# Patient Record
Sex: FEMALE | Race: WHITE | NOT HISPANIC OR LATINO | Employment: STUDENT | URBAN - METROPOLITAN AREA
[De-identification: names, ages, dates, MRNs, and addresses within clinical notes are randomized per-mention and may not be internally consistent; named-entity substitution may affect disease eponyms.]

---
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
## 2021-01-04 RX ORDER — FLUOXETINE 10 MG/1
CAPSULE ORAL
Qty: 90 CAPSULE | Refills: 2 | OUTPATIENT
Start: 2021-01-04

## 2021-01-04 RX ORDER — FLUOXETINE HYDROCHLORIDE 40 MG/1
40 CAPSULE ORAL DAILY
COMMUNITY

## 2021-05-24 VITALS
BODY MASS INDEX: 28.66 KG/M2 | TEMPERATURE: 98.2 F | BODY MASS INDEX: 28.99 KG/M2 | SYSTOLIC BLOOD PRESSURE: 114 MMHG | DIASTOLIC BLOOD PRESSURE: 78 MMHG | WEIGHT: 174 LBS | SYSTOLIC BLOOD PRESSURE: 110 MMHG | HEIGHT: 65 IN | WEIGHT: 172.62 LBS | HEART RATE: 82 BPM | RESPIRATION RATE: 14 BRPM | OXYGEN SATURATION: 93 % | SYSTOLIC BLOOD PRESSURE: 118 MMHG | DIASTOLIC BLOOD PRESSURE: 82 MMHG | DIASTOLIC BLOOD PRESSURE: 70 MMHG | RESPIRATION RATE: 16 BRPM | HEART RATE: 80 BPM | HEART RATE: 88 BPM | WEIGHT: 172 LBS | HEIGHT: 65 IN

## 2022-02-12 VITALS
SYSTOLIC BLOOD PRESSURE: 107 MMHG | BODY MASS INDEX: 34.25 KG/M2 | TEMPERATURE: 98.4 F | OXYGEN SATURATION: 98 % | WEIGHT: 209 LBS | DIASTOLIC BLOOD PRESSURE: 71 MMHG | HEART RATE: 91 BPM

## 2022-02-12 VITALS
SYSTOLIC BLOOD PRESSURE: 102 MMHG | HEIGHT: 66 IN | DIASTOLIC BLOOD PRESSURE: 66 MMHG | RESPIRATION RATE: 20 BRPM | BODY MASS INDEX: 31.82 KG/M2 | WEIGHT: 198 LBS | TEMPERATURE: 99.4 F | HEART RATE: 100 BPM

## 2022-02-15 NOTE — PROGRESS NOTES
Patient:   HIRAL DUBON            MRN: [id number]            FIN: [id number]               Age:   20 years     Sex:  Female     :  2000   Associated Diagnoses:   Acute otitis media, right   Author:   Ned Centeno MD      Visit Information      Date of Service: 03/10/2020 06:20 pm  Performing Location: [facility]  Encounter#: [id number]      Primary Care Provider (PCP):  NONE ,       Referring Provider:  Ned Centeno MD    NPI# [id number]      Chief Complaint   3/10/2020 6:26 PM CDT    Patient presents with sore throat, bilateral ear pain and nasal congestion        History of Present Illness   chief complaint and symptoms as noted above confirmed with patient   sxs 1 week  green nasal dc  slight cough  no sob  no fever      Review of Systems   Constitutional:  Negative except as documented in history of present illness.    Eye:  Negative.    Ear/Nose/Mouth/Throat:  Negative except as documented in history of present illness.    Respiratory:  Negative except as documented in history of present illness.    Cardiovascular:  Negative.    Integumentary:  Negative.    Neurologic:  Negative.       Health Status   Allergies:    Allergic Reactions (Selected)  No Known Medication Allergies   Medications:  (Selected)   Prescriptions  Prescribed  Zofran ODT 8 mg oral tablet, disintegrating: = 1 tab(s) ( 8 mg ), po, q8 hrs, PRN: for nausea/vomiting, # 12 tab(s), 0 Refill(s), Type: Maintenance, Pharmacy: [facility] #[id number], 1 tab(s) Oral q8 hrs,PRN:for nausea/vomiting  amoxicillin 875 mg oral tablet: = 1 tab(s) ( 875 mg ), Oral, bid, x 10 day(s), # 20 tab(s), 0 Refill(s), Type: Acute, Pharmacy: [facility] #[id number], 1 tab(s) Oral bid,x10 day(s)  Documented Medications  Documented  FLUoxetine: ( 30 mg ), Oral, Instructions: 30 mg daily, 0 Refill(s), Type: Maintenance  Nexplanon 68 mg subcutaneous implant: = 1 EA ( 68 mg ), Subcutaneous, once, 0 Refill(s), Type:  Maintenance  fluticasone 50 mcg/inh nasal spray: Nasal, daily, 0 Refill(s), Type: Maintenance,    Medications          *denotes recorded medication          *FLUoxetine: 30 mg, Oral, 30 mg daily, 0 Refill(s).          amoxicillin 875 mg oral tablet: 875 mg, 1 tab(s), Oral, bid, for 10 day(s), 20 tab(s), 0 Refill(s).          *Nexplanon 68 mg subcutaneous implant: 68 mg, 1 EA, Subcutaneous, once, 0 Refill(s).          *fluticasone 50 mcg/inh nasal spray: Nasal, daily, 0 Refill(s).          Zofran ODT 8 mg oral tablet, disintegratin mg, 1 tab(s), po, q8 hrs, PRN: for nausea/vomiting, 12 tab(s), 0 Refill(s).       Problem list:    All Problems  Obesity / SNOMED CT 6874480787 / Probable      Histories   Past Medical History:    No active or resolved past medical history items have been selected or recorded.   Family History:    CA - Breast cancer  Grandmother (M)  Thyroid disease  Father  Diabetes mellitus type II  Grandfather (P)  Kidney disease  Father  Depression  Sister  CA - Cancer of prostate  Grandfather (P)     Procedure history:    Ts and As - Tonsillectomy and adenoidectomy (SNOMED CT 7885932393).  History of appendectomy (SNOMED CT 4736721884).  History of cholecystectomy (SNOMED CT 2560325433).  Extraction of wisdom tooth (SNOMED CT 592824765).   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      Home and Environment Assessment            Marital status: Single.      Nutrition and Health Assessment            Type of diet: Regular.  Sleeping concerns: Yes.  Concerns about weight/body image: No.  Obtaining food is a               problem: No.      Exercise and Physical Activity Assessment            Exercise type: Walking.        Physical Examination   Vital Signs    3/10/2020 6:26 PM CDT Temperature Tympanic 98.4 DegF    Peripheral Pulse Rate 91 bpm    Systolic Blood Pressure 107 mmHg    Diastolic Blood Pressure 71 mmHg    Mean Arterial Pressure 83 mmHg    BP Site Right arm    BP Method Electronic    Oxygen Saturation 98 %      Measurements from flowsheet : Measurements   3/10/2020 6:26 PM CDT Height/Length Estimated 65.5 in    Weight Measured - Standard 209 lb      General:  Alert and oriented, No acute distress.    Eye:  Normal conjunctiva.    HENT:  No pharyngeal erythema.         Ear: Right ear, Tympanic membrane ( Erythematous, Fluid in middle ear ).    Neck:  Supple, Non-tender, No lymphadenopathy.    Respiratory:  Lungs are clear to auscultation, Respirations are non-labored.    Cardiovascular:  Normal rate, Regular rhythm.    Neurologic:  Alert, Oriented.       Impression and Plan   Diagnosis     Acute otitis media, right (LPM40-YL H66.91).     Course:  Not progressing as expected.    Plan:  amox  fu 1 week if not better sooner if worse.    Patient Instructions:       Counseled: Patient, Regarding diagnosis, Regarding treatment, Regarding medications, Activity.

## 2022-02-15 NOTE — NURSING NOTE
Depression Screening Entered On:  1/29/2020 6:54 PM CST    Performed On:  1/29/2020 6:54 PM CST by Cain Hickey CMA               Depression Screening   Depression Screening Assessment PHQ-9 :   Declines to be asked   Cain Hickey CMA - 1/29/2020 6:54 PM CST

## 2022-02-15 NOTE — NURSING NOTE
Pt given 8mg ODT ondansetron at  1800 for nausea per G orders.  Cain Hickey [facility]  Lot#[id number]  exp-7/30/2021  [facility].

## 2022-02-15 NOTE — NURSING NOTE
"Comprehensive Intake Entered On:  1/29/2020 5:37 PM CST    Performed On:  1/29/2020 5:23 PM CST by Cain Hickey CMA               Summary   Chief Complaint :   New pt here for vomiting,nausea and upper back pain that started this morning.  Pt states she has \"vomited 11 times today\"   Last Menstrual Period :   1/26/2020 CST   Menstrual Status :   Menarcheal   Weight Measured :   198 lb(Converted to: 198 lb 0 oz, 89.81 kg)    Height Measured :   65.5 in(Converted to: 5 ft 5 in, 166.37 cm)    Body Mass Index :   32.44 kg/m2   Body Surface Area :   2.04 m2   Systolic Blood Pressure :   102 mmHg   Diastolic Blood Pressure :   66 mmHg   Mean Arterial Pressure :   78 mmHg   Peripheral Pulse Rate :   100 bpm   BP Site :   Right arm   Pulse Site :   Radial artery   Temperature Tympanic :   99.4 DegF(Converted to: 37.4 DegC)    Respiratory Rate :   20 br/min   Cain Hickey CMA - 1/29/2020 5:23 PM CST   Health Status   Allergies Verified? :   Yes   Medication History Verified? :   Yes   Medical History Verified? :   Yes   Pre-Visit Planning Status :   Not completed   Tobacco Use? :   Former smoker   Cain Hickey CMA - 1/29/2020 5:23 PM CST   Meds / Allergies   (As Of: 1/29/2020 5:37:45 PM CST)   Allergies (Active)   No Known Medication Allergies  Estimated Onset Date:   Unspecified ; Created By:   Cain Hickey CMA; Reaction Status:   Active ; Category:   Drug ; Substance:   No Known Medication Allergies ; Type:   Allergy ; Updated By:   Cain Hickey CMA; Reviewed Date:   1/29/2020 5:32 PM CST        Medication List   (As Of: 1/29/2020 5:37:45 PM CST)   Home Meds    FLUoxetine  :   FLUoxetine ; Status:   Documented ; Ordered As Mnemonic:   FLUoxetine ; Simple Display Line:   30 mg, Oral, 30 mg daily, 0 Refill(s) ; Catalog Code:   FLUoxetine ; Order Dt/Tm:   1/29/2020 5:32:03 PM CST          etonogestrel  :   etonogestrel ; Status:   Documented ; Ordered As Mnemonic:   Nexplanon 68 mg subcutaneous implant ; Simple " Display Line:   68 mg, 1 EA, Subcutaneous, once, 0 Refill(s) ; Catalog Code:   etonogestrel ; Order Dt/Tm:   1/29/2020 5:31:20 PM CST          fluticasone nasal  :   fluticasone nasal ; Status:   Documented ; Ordered As Mnemonic:   fluticasone 50 mcg/inh nasal spray ; Simple Display Line:   Nasal, daily, 0 Refill(s) ; Catalog Code:   fluticasone nasal ; Order Dt/Tm:   1/29/2020 5:31:47 PM CST            Past Medical History   Past Medical History   (As Of: 1/29/2020 5:37:45 PM CST)     Procedures / Surgeries        -    Procedure History   (As Of: 1/29/2020 5:37:45 PM CST)     Anesthesia Minutes:   0 ; Procedure Name:   Extraction of wisdom tooth ; Procedure Minutes:   0 ; Last Reviewed Dt/Tm:   1/29/2020 5:34:04 PM CST            Anesthesia Minutes:   0 ; Procedure Name:   History of cholecystectomy ; Procedure Minutes:   0 ; Last Reviewed Dt/Tm:   1/29/2020 5:33:57 PM CST            Anesthesia Minutes:   0 ; Procedure Name:   History of appendectomy ; Procedure Minutes:   0 ; Last Reviewed Dt/Tm:   1/29/2020 5:33:47 PM CST            Anesthesia Minutes:   0 ; Procedure Name:   Ts and As - Tonsillectomy and adenoidectomy ; Procedure Minutes:   0 ; Last Reviewed Dt/Tm:   1/29/2020 5:33:31 PM CST            Family History   Family History   (As Of: 1/29/2020 5:37:45 PM CST)     Social History   Social History   (As Of: 1/29/2020 5:37:45 PM CST)   Tobacco:        Never (less than 100 in lifetime)   (Last Updated: 1/29/2020 5:32:44 PM CST by Cain Hickey CMA)          Employment/School:        Student, 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.   (Last Updated: 1/29/2020 5:33:04 PM CST by Cain Hickey CMA)

## 2022-02-15 NOTE — PROGRESS NOTES
"   Patient:   HIRAL DUBON            MRN: [id number]            FIN: [id number]               Age:   19 years     Sex:  Female     :  2000   Associated Diagnoses:   Gastroenteritis, acute   Author:   Rosendo Granados PA-C      Chief Complaint   2020 5:23 PM CST    New pt here for vomiting,nausea and upper back pain that started this morning.  Pt states she has \"vomited 11 times today\"      History of Present Illness   Chief complaint and symptoms noted above and confirmed with patient   nausea and vomiting that started this morning,  emesis x 11 today  chills and sweats, upper back pain  has not been able to keep fluids down today  no new foods,   took some pepto bismol this am,   she is having some blood in her urine because she is currently menstruating (despite being on nexplanon)         Review of Systems   Constitutional:  Chills, Sweats.    Ear/Nose/Mouth/Throat:  No nasal congestion, No sore throat.    Respiratory:  No cough.    Gastrointestinal:  Nausea, Vomiting, No diarrhea, No constipation.    Genitourinary:  No dysuria.       Health Status   Allergies:    Allergic Reactions (Selected)  No Known Medication Allergies   Medications:  (Selected)   Documented Medications  Documented  FLUoxetine: ( 30 mg ), Oral, Instructions: 30 mg daily, 0 Refill(s), Type: Maintenance  Nexplanon 68 mg subcutaneous implant: = 1 EA ( 68 mg ), Subcutaneous, once, 0 Refill(s), Type: Maintenance  fluticasone 50 mcg/inh nasal spray: Nasal, daily, 0 Refill(s), Type: Maintenance   Problem list:    All Problems  Obesity / SNOMED CT 9064650337 / Probable      Histories   Past Medical History:    No active or resolved past medical history items have been selected or recorded.   Family History:    CA - Breast cancer  Grandmother (M)  Thyroid disease  Father  Diabetes mellitus type II  Grandfather (P)  Kidney disease  Father  Depression  Sister  CA - Cancer of prostate  Grandfather (P)     Procedure history:    Ts and As - " Tonsillectomy and adenoidectomy (5413738200).  History of appendectomy (9697094497).  History of cholecystectomy (3088552733).  Extraction of wisdom tooth (474989730).      Physical Examination   Vital Signs   1/29/2020 5:23 PM CST Temperature Tympanic 99.4 DegF    Peripheral Pulse Rate 100 bpm    Pulse Site Radial artery    Respiratory Rate 20 br/min    Systolic Blood Pressure 102 mmHg    Diastolic Blood Pressure 66 mmHg    Mean Arterial Pressure 78 mmHg    BP Site Right arm      Measurements from flowsheet : Measurements   1/29/2020 5:23 PM CST Height Measured - Standard 65.5 in    Weight Measured - Standard 198 lb    BSA 2.04 m2    Body Mass Index 32.44 kg/m2    Body Mass Index Percentile 96.17      General:  Mild distress.    HENT:  Tympanic membranes are clear, No pharyngeal erythema, No sinus tenderness.    Neck:  Supple, Non-tender, No lymphadenopathy.    Respiratory:  Lungs are clear to auscultation.    Cardiovascular:  Normal rate, Regular rhythm, No murmur.    Gastrointestinal:  Soft, Non-distended, Normal bowel sounds, No organomegaly, RLQ tenderness.    Genitourinary:  right CVA tenderness.    Psychiatric:  Appropriate mood & affect.       Review / Management   Results review:  Lab results   1/29/2020 6:38 PM CST UA Epithelial Cells Moderate    UA Mucous Present    UA WBC 0-2    UA RBC >100    UA Bacteria Many   1/29/2020 6:24 PM CST UA pH 5.5    UA Specific Gravity > OR = 1.030    UA Glucose NEGATIVE    UA Bilirubin NEGATIVE    UA Ketones 2+    Urine Occult Blood 3+    UA Protein NEGATIVE    UA Nitrite NEGATIVE    UA Leukocyte Esterase NEGATIVE   1/29/2020 6:17 PM CST Sodium Level 139 mmol/L    Potassium Level 4.1 mmol/L    Chloride Level 107 mmol/L    CO2 Level 24 mmol/L    AGAP 8    Glucose Level 120 mg/dL    BUN 16 mg/dL    Creatinine Level 0.74 mg/dL    BUN/Creat Ratio 22    eGFR  >60    eGFR Non-African American >60    Calcium Level 9.3 mg/dL    Bilirubin Total 1.0 mg/dL     Bilirubin Direct 0.3 mg/dL    Bilirubin Indirect 0.7 mg/dL    Alkaline Phosphatase 71 IU/L    AST/SGOT 19 IU/L    ALT/SGPT 14 IU/L    Protein Total 7.2 g/dL    Albumin Level 4.5 g/dL    Globulin 2.7 g/dL    A/G Ratio 1.7   .         Interpretation: Labs unremarkable.    Course:  she is given 8 mg zofran ODT and feels a little better 30 min later.       Impression and Plan   Diagnosis     Gastroenteritis, acute (YDC01-LF K52.9).     Plan:  Oral rehydration.         Diet: Bananas, Rice, Applesauce and Toast diet, Increase fluid intake.    Summary:  will give zofran to use prn at home, advised to push fluids (frequent small sips), bland diet, follow up if not improving.    Orders     Orders   Lab (Gen Lab  Reference Lab):  POC URINALYSIS, UA* ([facility]) (Order): Specimen Type: Urine, Collection Date: 1/29/2020 5:55 PM CST  Requests (Lab):  Hepatic Function Panel (Request) (Order): Priority: Urgent, Gastroenteritis, acute  Basic Metabolic Panel (Request) (Order): Priority: Urgent, Gastroenteritis, acute.     Orders   Pharmacy:  Zofran ODT 8 mg oral tablet, disintegrating (Prescribe): = 1 tab(s) ( 8 mg ), po, q8 hrs, PRN: for nausea/vomiting, # 12 tab(s), 0 Refill(s), Type: Maintenance, [id number], 1 tab(s) Oral q8 hrs,PRN:for nausea/vomiting.     Orders   Charges (Evaluation and Management):  [id number] office outpatient new 30 minutes (Charge) (Order): Quantity: 1, Gastroenteritis, acute.

## 2022-02-15 NOTE — NURSING NOTE
Comprehensive Intake Entered On:  3/10/2020 6:31 PM CDT    Performed On:  3/10/2020 6:26 PM CDT by Mary Grace Hummel CMA               Summary   Chief Complaint :   Patient presents with sore throat, bilateral ear pain and nasal congestion   Menstrual Status :   Menarcheal   Weight Measured :   209 lb(Converted to: 209 lb 0 oz, 94.80 kg)    Height/Length Estimated :   65.5 in(Converted to: 5 ft 5 in, 166.37 cm)    Systolic Blood Pressure :   107 mmHg   Diastolic Blood Pressure :   71 mmHg   Mean Arterial Pressure :   83 mmHg   Peripheral Pulse Rate :   91 bpm   BP Site :   Right arm   BP Method :   Electronic   Temperature Tympanic :   98.4 DegF(Converted to: 36.9 DegC)    Oxygen Saturation :   98 %   Mary Grace Hummel CMA - 3/10/2020 6:26 PM CDT   Health Status   Allergies Verified? :   Yes   Medication History Verified? :   Yes   Medical History Verified? :   Yes   Pre-Visit Planning Status :   Completed   Tobacco Use? :   Never smoker   Mary Grace Hummel CMA - 3/10/2020 6:26 PM CDT   Consents   Consent for Immunization Exchange :   Consent Granted   Consent for Immunizations to Providers :   Consent Granted   Mary Grace Hummel CMA - 3/10/2020 6:26 PM CDT   Meds / Allergies   (As Of: 3/10/2020 6:31:44 PM CDT)   Allergies (Active)   No Known Medication Allergies  Estimated Onset Date:   Unspecified ; Created By:   Cain Hickey CMA; Reaction Status:   Active ; Category:   Drug ; Substance:   No Known Medication Allergies ; Type:   Allergy ; Updated By:   Cain Hickey CMA; Reviewed Date:   3/10/2020 6:29 PM CDT        Medication List   (As Of: 3/10/2020 6:31:44 PM CDT)   Prescription/Discharge Order    ondansetron  :   ondansetron ; Status:   Prescribed ; Ordered As Mnemonic:   Zofran ODT 8 mg oral tablet, disintegrating ; Simple Display Line:   8 mg, 1 tab(s), po, q8 hrs, PRN: for nausea/vomiting, 12 tab(s), 0 Refill(s) ; Ordering Provider:   Rosendo Granados PA-C; Catalog Code:   ondansetron ; Order Dt/Tm:   1/29/2020  7:08:11 PM CST            Home Meds    etonogestrel  :   etonogestrel ; Status:   Documented ; Ordered As Mnemonic:   Nexplanon 68 mg subcutaneous implant ; Simple Display Line:   68 mg, 1 EA, Subcutaneous, once, 0 Refill(s) ; Catalog Code:   etonogestrel ; Order Dt/Tm:   1/29/2020 5:31:20 PM CST          FLUoxetine  :   FLUoxetine ; Status:   Documented ; Ordered As Mnemonic:   FLUoxetine ; Simple Display Line:   30 mg, Oral, 30 mg daily, 0 Refill(s) ; Catalog Code:   FLUoxetine ; Order Dt/Tm:   1/29/2020 5:32:03 PM CST          fluticasone nasal  :   fluticasone nasal ; Status:   Documented ; Ordered As Mnemonic:   fluticasone 50 mcg/inh nasal spray ; Simple Display Line:   Nasal, daily, 0 Refill(s) ; Catalog Code:   fluticasone nasal ; Order Dt/Tm:   1/29/2020 5:31:47 PM CST

## 2022-06-29 ENCOUNTER — OFFICE VISIT (OUTPATIENT)
Dept: FAMILY MEDICINE | Facility: CLINIC | Age: 22
End: 2022-06-29
Payer: COMMERCIAL

## 2022-06-29 VITALS
RESPIRATION RATE: 20 BRPM | BODY MASS INDEX: 40.98 KG/M2 | OXYGEN SATURATION: 99 % | HEIGHT: 66 IN | DIASTOLIC BLOOD PRESSURE: 70 MMHG | WEIGHT: 255 LBS | TEMPERATURE: 99.1 F | HEART RATE: 100 BPM | SYSTOLIC BLOOD PRESSURE: 102 MMHG

## 2022-06-29 DIAGNOSIS — E66.01 MORBID OBESITY (H): ICD-10-CM

## 2022-06-29 DIAGNOSIS — Z23 NEED FOR VACCINATION: ICD-10-CM

## 2022-06-29 DIAGNOSIS — Z71.84 TRAVEL ADVICE ENCOUNTER: Primary | ICD-10-CM

## 2022-06-29 DIAGNOSIS — J06.9 VIRAL URI: ICD-10-CM

## 2022-06-29 PROBLEM — R00.0 TACHYCARDIA: Status: ACTIVE | Noted: 2021-12-22

## 2022-06-29 PROBLEM — Q79.62 EHLERS-DANLOS, HYPERMOBILE TYPE: Status: ACTIVE | Noted: 2019-01-31

## 2022-06-29 PROBLEM — F51.04 PSYCHOPHYSIOLOGICAL INSOMNIA: Status: ACTIVE | Noted: 2021-05-24

## 2022-06-29 PROBLEM — R55 VASOVAGAL NEAR SYNCOPE: Status: ACTIVE | Noted: 2018-01-02

## 2022-06-29 PROBLEM — E66.9 OBESITY: Status: ACTIVE | Noted: 2022-06-29

## 2022-06-29 PROBLEM — F41.1 GAD (GENERALIZED ANXIETY DISORDER): Status: ACTIVE | Noted: 2018-04-25

## 2022-06-29 PROBLEM — Z90.49 HISTORY OF LAPAROSCOPIC CHOLECYSTECTOMY: Status: ACTIVE | Noted: 2017-07-17

## 2022-06-29 PROBLEM — Z90.49 HISTORY OF APPENDECTOMY: Status: ACTIVE | Noted: 2017-07-17

## 2022-06-29 PROBLEM — G43.009 MIGRAINE WITHOUT AURA AND WITHOUT STATUS MIGRAINOSUS, NOT INTRACTABLE: Status: ACTIVE | Noted: 2020-12-02

## 2022-06-29 PROBLEM — N94.6 DYSMENORRHEA: Status: ACTIVE | Noted: 2019-07-03

## 2022-06-29 PROCEDURE — 90651 9VHPV VACCINE 2/3 DOSE IM: CPT | Performed by: PHYSICIAN ASSISTANT

## 2022-06-29 PROCEDURE — 90472 IMMUNIZATION ADMIN EACH ADD: CPT | Performed by: PHYSICIAN ASSISTANT

## 2022-06-29 PROCEDURE — U0003 INFECTIOUS AGENT DETECTION BY NUCLEIC ACID (DNA OR RNA); SEVERE ACUTE RESPIRATORY SYNDROME CORONAVIRUS 2 (SARS-COV-2) (CORONAVIRUS DISEASE [COVID-19]), AMPLIFIED PROBE TECHNIQUE, MAKING USE OF HIGH THROUGHPUT TECHNOLOGIES AS DESCRIBED BY CMS-2020-01-R: HCPCS | Performed by: PHYSICIAN ASSISTANT

## 2022-06-29 PROCEDURE — 90471 IMMUNIZATION ADMIN: CPT | Performed by: PHYSICIAN ASSISTANT

## 2022-06-29 PROCEDURE — U0005 INFEC AGEN DETEC AMPLI PROBE: HCPCS | Performed by: PHYSICIAN ASSISTANT

## 2022-06-29 PROCEDURE — 99214 OFFICE O/P EST MOD 30 MIN: CPT | Mod: CS | Performed by: PHYSICIAN ASSISTANT

## 2022-06-29 PROCEDURE — 90715 TDAP VACCINE 7 YRS/> IM: CPT | Performed by: PHYSICIAN ASSISTANT

## 2022-06-29 RX ORDER — NORTRIPTYLINE HCL 10 MG
CAPSULE ORAL
COMMUNITY
Start: 2022-06-18

## 2022-06-29 RX ORDER — NAPROXEN SODIUM 220 MG
220 TABLET ORAL
COMMUNITY

## 2022-06-29 RX ORDER — FROVATRIPTAN SUCCINATE 2.5 MG/1
TABLET, FILM COATED ORAL
COMMUNITY
Start: 2021-02-04

## 2022-06-29 RX ORDER — IBUPROFEN 200 MG
200-400 TABLET ORAL
COMMUNITY

## 2022-06-29 RX ORDER — ACETAMINOPHEN 500 MG
500 TABLET ORAL
COMMUNITY

## 2022-06-29 RX ORDER — AZITHROMYCIN 500 MG/1
500 TABLET, FILM COATED ORAL DAILY
Qty: 3 TABLET | Refills: 0 | Status: SHIPPED | OUTPATIENT
Start: 2022-06-29 | End: 2022-07-02

## 2022-06-29 RX ORDER — OMEGA-3/DHA/EPA/FISH OIL 60 MG-90MG
500 CAPSULE ORAL
COMMUNITY

## 2022-06-29 RX ORDER — FLUOXETINE 40 MG/1
40 CAPSULE ORAL
COMMUNITY
Start: 2021-12-22

## 2022-06-29 ASSESSMENT — ENCOUNTER SYMPTOMS: COUGH: 1

## 2022-06-29 NOTE — PATIENT INSTRUCTIONS
You need to take the typhoid oral medication as ordered.  You will not have full protection immediately.    You were given zithrormax pills to take for severe traveler's diarrhea.    Use imodium or pepto bismol for mild diarrhea.    Take precautions to drink only bottled water, peal fruits and vegetables, make sure all meats are well cooked.

## 2022-06-29 NOTE — PROGRESS NOTES
Assessment & Plan     Travel advice encounter    Tetanus immunization given   - azithromycin (ZITHROMAX) 500 MG tablet for severe traveler's diarrhea. Discussed management of mild diarrhea.   Dispense: 3 tablet; Refill: 0  - typhoid (VIVOTIF) CR capsule  Dispense: 4 capsule; Refill: 0  No need for malaria prophylaxis given location of travel.    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    Viral URI  Conservative measures discussed.  Push fluids, rest and ibuprofen or tylenol for comfort.        Need for vaccination      BILLY Pugh [facility]    Christiano Harmon is a 22 year old female who presents to clinic today for the following health issues:  Chief Complaint   Patient presents with     Cough     Dry cough x 2-3 weeks.     Cough    History of Present Illness       Reason for visit:  Dry, itchy cough  Symptom onset:  3-4 weeks ago  Symptoms include:  Dry, itchy cough  Symptom intensity:  Moderate  Symptom progression:  Worsening  Had these symptoms before:  No  What makes it worse:  Talking/ singing  What makes it better:  Cough drops    She eats 2-3 servings of fruits and vegetables daily.She consumes 0 sweetened beverage(s) daily.She exercises with enough effort to increase her heart rate 9 or less minutes per day.  She exercises with enough effort to increase her heart rate 3 or less days per week.   She is taking medications regularly.    Dry itchy cough.  Fits of coughing, talk or deep breathing.    No rhinorrhea, congestion no fevers.    no rhinorrhea, congestion, or facial pain or pressure.    Some mild allergies but not bothersome currently.    Allergy medication OTC helpful.    No nausea, vomiting diarrhea.    No sob or difficulty breathing.          Review of Systems   Respiratory: Positive for cough.      Constitutional, HEENT, cardiovascular, pulmonary, gi and  "gu systems are negative, except as otherwise noted.      Objective    /70 (BP Location: Right arm, Patient Position: Sitting, Cuff Size: Adult Large)   Pulse 100   Temp 99.1  F (37.3  C) (Tympanic)   Resp 20   Ht 1.664 m (5' 5.5\")   Wt 115.7 kg (255 lb)   SpO2 99%   BMI 41.79 kg/m    Physical Exam   GENERAL: healthy, alert and no distress  NECK: no adenopathy, no asymmetry, masses, or scars and thyroid normal to palpation  RESP: lungs clear to auscultation - no rales, rhonchi or wheezes  CV: regular rate and rhythm, normal S1 S2, no S3 or S4, no murmur, click or rub, no peripheral edema and peripheral pulses strong  ABDOMEN: soft, nontender, no hepatosplenomegaly, no masses and bowel sounds normal  MS: no gross musculoskeletal defects noted, no edema        "

## 2022-06-30 ENCOUNTER — TELEPHONE (OUTPATIENT)
Dept: FAMILY MEDICINE | Facility: CLINIC | Age: 22
End: 2022-06-30

## 2022-06-30 LAB — SARS-COV-2 RNA RESP QL NAA+PROBE: NEGATIVE

## 2022-06-30 NOTE — TELEPHONE ENCOUNTER
----- Message from Mercedes Rueda PA-C sent at 6/30/2022  5:10 PM CDT -----  Team - please call patient with results.

## 2022-07-24 ENCOUNTER — HEALTH MAINTENANCE LETTER (OUTPATIENT)
Age: 22
End: 2022-07-24

## 2022-08-08 ENCOUNTER — OFFICE VISIT (OUTPATIENT)
Dept: FAMILY MEDICINE | Facility: CLINIC | Age: 22
End: 2022-08-08
Payer: COMMERCIAL

## 2022-08-08 VITALS
BODY MASS INDEX: 40.98 KG/M2 | SYSTOLIC BLOOD PRESSURE: 138 MMHG | WEIGHT: 255 LBS | DIASTOLIC BLOOD PRESSURE: 84 MMHG | TEMPERATURE: 98.3 F | HEIGHT: 66 IN | HEART RATE: 120 BPM

## 2022-08-08 DIAGNOSIS — J02.9 SORE THROAT: ICD-10-CM

## 2022-08-08 DIAGNOSIS — R05.9 COUGH: ICD-10-CM

## 2022-08-08 DIAGNOSIS — R53.83 FATIGUE, UNSPECIFIED TYPE: ICD-10-CM

## 2022-08-08 DIAGNOSIS — J18.9 ATYPICAL PNEUMONIA: Primary | ICD-10-CM

## 2022-08-08 LAB
DEPRECATED S PYO AG THROAT QL EIA: NEGATIVE
GROUP A STREP BY PCR: NOT DETECTED

## 2022-08-08 PROCEDURE — 87651 STREP A DNA AMP PROBE: CPT | Performed by: FAMILY MEDICINE

## 2022-08-08 PROCEDURE — U0003 INFECTIOUS AGENT DETECTION BY NUCLEIC ACID (DNA OR RNA); SEVERE ACUTE RESPIRATORY SYNDROME CORONAVIRUS 2 (SARS-COV-2) (CORONAVIRUS DISEASE [COVID-19]), AMPLIFIED PROBE TECHNIQUE, MAKING USE OF HIGH THROUGHPUT TECHNOLOGIES AS DESCRIBED BY CMS-2020-01-R: HCPCS | Performed by: FAMILY MEDICINE

## 2022-08-08 PROCEDURE — U0005 INFEC AGEN DETEC AMPLI PROBE: HCPCS | Performed by: FAMILY MEDICINE

## 2022-08-08 PROCEDURE — 99213 OFFICE O/P EST LOW 20 MIN: CPT | Mod: CS | Performed by: FAMILY MEDICINE

## 2022-08-08 RX ORDER — ALBUTEROL SULFATE 90 UG/1
2 AEROSOL, METERED RESPIRATORY (INHALATION) EVERY 4 HOURS PRN
Qty: 18 G | Refills: 0 | Status: SHIPPED | OUTPATIENT
Start: 2022-08-08 | End: 2022-08-22

## 2022-08-08 ASSESSMENT — ENCOUNTER SYMPTOMS
COUGH: 1
SORE THROAT: 1

## 2022-08-08 ASSESSMENT — PATIENT HEALTH QUESTIONNAIRE - PHQ9
10. IF YOU CHECKED OFF ANY PROBLEMS, HOW DIFFICULT HAVE THESE PROBLEMS MADE IT FOR YOU TO DO YOUR WORK, TAKE CARE OF THINGS AT HOME, OR GET ALONG WITH OTHER PEOPLE: SOMEWHAT DIFFICULT
SUM OF ALL RESPONSES TO PHQ QUESTIONS 1-9: 14
SUM OF ALL RESPONSES TO PHQ QUESTIONS 1-9: 14

## 2022-08-08 NOTE — PROGRESS NOTES
"  Assessment & Plan   Problem List Items Addressed This Visit    None     Visit Diagnoses     Atypical pneumonia    -  Primary    Relevant Medications    albuterol (PROAIR HFA/PROVENTIL HFA/VENTOLIN HFA) 108 (90 Base) MCG/ACT inhaler    Sore throat        Relevant Orders    Streptococcus A Rapid Screen w/Reflex to PCR - Clinic Collect (Completed)    Symptomatic; Unknown COVID-19 Virus (Coronavirus) by PCR Nose    Group A Streptococcus PCR Throat Swab    Fatigue, unspecified type        Relevant Orders    Streptococcus A Rapid Screen w/Reflex to PCR - Clinic Collect (Completed)    Symptomatic; Unknown COVID-19 Virus (Coronavirus) by PCR Nose    Group A Streptococcus PCR Throat Swab    Cough        Relevant Medications    albuterol (PROAIR HFA/PROVENTIL HFA/VENTOLIN HFA) 108 (90 Base) MCG/ACT inhaler         She has azithromycin leftover at home from her travels because she did not need them.  She has 3 500 mg tablets.  I wrote out for her how she should take the medication.  We will add an albuterol inhaler 2 puffs every 4 hours as needed to see if this helps with her cough  If she does not get completely better with this regimen she will follow-up in clinic and I would recommend a chest x-ray at that point.  Patient verbalized understanding.           BMI:   Estimated body mass index is 41.79 kg/m  as calculated from the following:    Height as of this encounter: 1.664 m (5' 5.5\").    Weight as of this encounter: 115.7 kg (255 lb).           No follow-ups on file.    Melany Lucas MD  [facility]    Subjective      Patient is a 22-year-old female who complains of 3 weeks of cough which is productive.  She has a sore throat and her neck feels sore especially at night.  She has swollen lymph nodes in her neck.  She generally feels tired and at first she thought it was just leg but it has been ongoing.  She denies fevers and chills.  She has no shortness of breath.    She did a study " abroad for 2 weeks in [place].  She did get all required vaccines before she left.  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    She does report a dry itchy cough before she left for her study abroad and it resolved while she was gone.    Regarding patient's PHQ-9 score of 14 today: She states that she feels her depression is under good control on her fluoxetine and that her high score today is from her current illness with fatigue and low appetite.      Eden is a 22 year old, presenting for the following health issues:  Pharyngitis (3-4 weeks ), Cough (Neg at home covid test 1 week ago. ), Fatigue, and Otalgia (Right ear pain)      Pharyngitis   Associated symptoms include cough.   Cough  Associated symptoms include sore throat.   History of Present Illness       Reason for visit:  Sore throat and cough  Symptom onset:  3-4 weeks ago  Symptoms include:  Sore throat, cough, extreme fatigue  Symptom intensity:  Moderate  Symptom progression:  Worsening  Had these symptoms before:  No  What makes it worse:  The sore throat gets really bad at night  What makes it better:  Cough drops    She eats 2-3 servings of fruits and vegetables daily.She consumes 0 sweetened beverage(s) daily.She exercises with enough effort to increase her heart rate 9 or less minutes per day.  She exercises with enough effort to increase her heart rate 4 days per week.   She is taking medications regularly.    Today's PHQ-9         PHQ-9 Total Score: 14    PHQ-9 Q9 Thoughts of better off dead/self-harm past 2 weeks :   Not at all    How difficult have these problems made it for you to do your work, take care of things at home, or get along with other people: Somewhat difficult             Review of Systems   HENT: Positive for sore throat.    Respiratory: Positive for cough.    Negative except as listed in HPI        Objective    /84   Pulse 120   Temp 98.3  F (36.8  C)   Ht 1.664 m  "(5' 5.5\")   Wt 115.7 kg (255 lb)   BMI 41.79 kg/m    Body mass index is 41.79 kg/m .  Physical Exam   Vitals noted and within normal limits.  Patient is alert, oriented, and in no acute distress.  Eyes: Conjunctive not injected.  Ears: Canals patent, TMs intact, no erythema and no bulging.  Mouth: Mucous membranes pink and moist.  Pharynx is not erythematous.  Neck supple with no cervical lymphadenopathy.  Heart has a regular rate and rhythm with no murmurs.  Lungs are clear to auscultation bilaterally with good air entry.  No wheezes, rales, rhonchi.                        .  ..  "

## 2022-08-09 LAB — SARS-COV-2 RNA RESP QL NAA+PROBE: NEGATIVE

## 2022-08-21 DIAGNOSIS — J18.9 ATYPICAL PNEUMONIA: ICD-10-CM

## 2022-08-21 DIAGNOSIS — R05.9 COUGH: ICD-10-CM

## 2022-08-22 RX ORDER — ALBUTEROL SULFATE 90 UG/1
AEROSOL, METERED RESPIRATORY (INHALATION)
Qty: 6.7 G | Refills: 0 | Status: SHIPPED | OUTPATIENT
Start: 2022-08-22

## 2022-08-22 NOTE — TELEPHONE ENCOUNTER
"Routing refill request to provider for review/approval because:  Patient given medication on 8/8/2022 and is already requesting a refill.    Please advise.    Last Written Prescription Date:  8/8/2022  Last Fill Quantity: 18 g,  # refills: 0   Last office visit provider:  8/8/2022 - Atypical pneumonia     Requested Prescriptions   Pending Prescriptions Disp Refills     albuterol (PROAIR HFA/PROVENTIL HFA/VENTOLIN HFA) 108 (90 Base) MCG/ACT inhaler [Pharmacy Med Name: ALBUTEROL HFA INH (200 PUFFS) 6.7GM] 6.7 g      Sig: INHALE TWO PUFFS BY MOUTH EVERY 4 HOURS AS NEEDED FOR SHORTNESS OF BREATH       Asthma Maintenance Inhalers - Anticholinergics Passed - 8/21/2022  3:42 AM        Passed - Patient is age 12 years or older        Passed - Recent (12 mo) or future (30 days) visit within the authorizing provider's specialty     Patient has had an office visit with the authorizing provider or a provider within the authorizing providers department within the previous 12 mos or has a future within next 30 days. See \"Patient Info\" tab in inbasket, or \"Choose Columns\" in Meds & Orders section of the refill encounter.              Passed - Medication is active on med list       Short-Acting Beta Agonist Inhalers Protocol  Passed - 8/21/2022  3:42 AM        Passed - Patient is age 12 or older        Passed - Recent (12 mo) or future (30 days) visit within the authorizing provider's specialty     Patient has had an office visit with the authorizing provider or a provider within the authorizing providers department within the previous 12 mos or has a future within next 30 days. See \"Patient Info\" tab in inbasket, or \"Choose Columns\" in Meds & Orders section of the refill encounter.              Passed - Medication is active on med list             oGod Wei RN 08/22/22 4:09 PM  "

## 2022-09-10 DIAGNOSIS — R05.9 COUGH: ICD-10-CM

## 2022-09-10 DIAGNOSIS — J18.9 ATYPICAL PNEUMONIA: ICD-10-CM

## 2022-09-12 RX ORDER — ALBUTEROL SULFATE 90 UG/1
AEROSOL, METERED RESPIRATORY (INHALATION)
Qty: 6.7 G | Refills: 0 | OUTPATIENT
Start: 2022-09-12

## 2022-10-03 ENCOUNTER — HEALTH MAINTENANCE LETTER (OUTPATIENT)
Age: 22
End: 2022-10-03

## 2023-02-12 ENCOUNTER — HEALTH MAINTENANCE LETTER (OUTPATIENT)
Age: 23
End: 2023-02-12

## 2024-03-10 ENCOUNTER — HEALTH MAINTENANCE LETTER (OUTPATIENT)
Age: 24
End: 2024-03-10

## 2025-03-16 ENCOUNTER — HEALTH MAINTENANCE LETTER (OUTPATIENT)
Age: 25
End: 2025-03-16